# Patient Record
Sex: MALE | Race: BLACK OR AFRICAN AMERICAN | Employment: OTHER | URBAN - METROPOLITAN AREA
[De-identification: names, ages, dates, MRNs, and addresses within clinical notes are randomized per-mention and may not be internally consistent; named-entity substitution may affect disease eponyms.]

---

## 2020-07-20 ENCOUNTER — HOSPITAL ENCOUNTER (OUTPATIENT)
Dept: INFUSION THERAPY | Age: 85
Discharge: HOME | End: 2020-07-20
Payer: MEDICARE

## 2020-07-20 ENCOUNTER — APPOINTMENT (OUTPATIENT)
Dept: INFUSION THERAPY | Age: 85
End: 2020-07-20

## 2020-07-20 VITALS
TEMPERATURE: 97.9 F | HEIGHT: 67 IN | SYSTOLIC BLOOD PRESSURE: 159 MMHG | DIASTOLIC BLOOD PRESSURE: 70 MMHG | RESPIRATION RATE: 18 BRPM | OXYGEN SATURATION: 98 % | BODY MASS INDEX: 22.76 KG/M2 | WEIGHT: 145 LBS | HEART RATE: 63 BPM

## 2020-07-20 PROCEDURE — 74011250636 HC RX REV CODE- 250/636: Performed by: INTERNAL MEDICINE

## 2020-07-20 PROCEDURE — 96365 THER/PROPH/DIAG IV INF INIT: CPT

## 2020-07-20 PROCEDURE — 74011000258 HC RX REV CODE- 258: Performed by: INTERNAL MEDICINE

## 2020-07-20 RX ORDER — SODIUM CHLORIDE 0.9 % (FLUSH) 0.9 %
10-40 SYRINGE (ML) INJECTION AS NEEDED
Status: DISCONTINUED | OUTPATIENT
Start: 2020-07-20 | End: 2020-07-22 | Stop reason: HOSPADM

## 2020-07-20 RX ADMIN — SODIUM CHLORIDE 125 MG: 900 INJECTION, SOLUTION INTRAVENOUS at 13:53

## 2020-07-20 RX ADMIN — Medication 10 ML: at 13:42

## 2020-07-20 RX ADMIN — Medication 10 ML: at 14:54

## 2020-07-20 NOTE — PROGRESS NOTES
[facility] Progress Note    Date: 2020    Name: Betito Hurtado    MRN: [id number]         : 1935    FERRLECIT Infusion (1st of 3 doses)    Mr. Jing Ayala to [facility], ambulatory, at 1325. Pt was assessed and education was provided. Patient presents today for the first of 3 infusions of Ferrlecit, as ordered. Patient was encouraged to verbalize questions or concerns. Patient's questions were answered and he denied concerns. Patient verbalize understanding of education provided. Mr. Larry Trotter vitals were reviewed and patient was observed for 5 minutes prior to treatment. Visit Vitals  BP (P) 157/68 (BP 1 Location: Right arm, BP Patient Position: At rest;Sitting)   Pulse (P) 66   Temp (P) 97.9 °F (36.6 °C)   Resp (P) 18   Ht 5' 7\" (1.702 m)   Wt 65.8 kg (145 lb)   SpO2 (P) 98%   BMI 22.71 kg/m²         22 g PIV placed in cephalic vein of left lower forearm x 1 attempt. PIV flushed easily and had brisk blood return. Site without evidence of complication or patient complaint. FERRLECIT 125 mg IV was initiated @ 110mL/hr, to infuse over 1 hour, as ordered. 5 minutes into infusion, VS stable and pt denied complaints of itching, lip/tongue/facial swelling, SOB, CP or other complaints. Mr. Jing Ayala tolerated the infusion, and had no complaints. VS remained stable. Patient up to bathroom and voiding freely. Tolerated p.o intake of ginger ale and snacks. Patient Vitals for the past 12 hrs:   Temp Pulse Resp BP SpO2   20 1501 97.9 °F (36.6 °C) 63 18 159/70 98 %   20 1402 97.9 °F (36.6 °C) 66 18 157/68 98 %   20 1334 98.4 °F (36.9 °C) 75 20 165/71 98 %       PIV flushed with NS 10 mL and catheter removed with tip intact. No bleeding, hematoma, or other evidence of complication noted at site. Gauze and coban applied.     Reviewed discharge instructions with patient, including expected side effects (abdominal cramping, nausea, changes in color of urine or feces) and signs of allergic reaction requiring medical attention (itching/hives/rashes, SOB, chest pain, lip/tongue/facial swelling). Patient given printed copy to take home. Patient verbalized understanding of discharge instructions. Patient armband removed and shredded. Mr. Manda Kaur was discharged from [place] [address] in stable condition at [phone] [id number]. He is to return to [place] [address] on 07/27/2020 at 1300 for his next appointment.       Graciela Salmeron RN  July 20, 2020  2:47 PM

## 2020-07-27 ENCOUNTER — HOSPITAL ENCOUNTER (OUTPATIENT)
Dept: INFUSION THERAPY | Age: 85
Discharge: HOME | End: 2020-07-27
Payer: MEDICARE

## 2020-07-27 VITALS
SYSTOLIC BLOOD PRESSURE: 144 MMHG | HEART RATE: 66 BPM | DIASTOLIC BLOOD PRESSURE: 70 MMHG | OXYGEN SATURATION: 97 % | TEMPERATURE: 97.9 F | RESPIRATION RATE: 16 BRPM

## 2020-07-27 PROBLEM — E61.1 IRON DEFICIENCY: Status: ACTIVE | Noted: 2020-07-27

## 2020-07-27 PROCEDURE — 74011000258 HC RX REV CODE- 258: Performed by: INTERNAL MEDICINE

## 2020-07-27 PROCEDURE — 96365 THER/PROPH/DIAG IV INF INIT: CPT

## 2020-07-27 PROCEDURE — 74011250636 HC RX REV CODE- 250/636: Performed by: INTERNAL MEDICINE

## 2020-07-27 RX ORDER — ATORVASTATIN CALCIUM 40 MG/1
40 TABLET, FILM COATED ORAL DAILY
COMMUNITY

## 2020-07-27 RX ORDER — BACLOFEN 10 MG/1
10 TABLET ORAL 3 TIMES DAILY
COMMUNITY

## 2020-07-27 RX ORDER — ASPIRIN 81 MG/1
81 TABLET ORAL DAILY
COMMUNITY

## 2020-07-27 RX ORDER — BISMUTH SUBSALICYLATE 262 MG
1 TABLET,CHEWABLE ORAL DAILY
COMMUNITY

## 2020-07-27 RX ORDER — SODIUM CHLORIDE 0.9 % (FLUSH) 0.9 %
10-40 SYRINGE (ML) INJECTION AS NEEDED
Status: DISCONTINUED | OUTPATIENT
Start: 2020-07-27 | End: 2020-07-29 | Stop reason: HOSPADM

## 2020-07-27 RX ADMIN — SODIUM CHLORIDE 125 MG: 900 INJECTION, SOLUTION INTRAVENOUS at 14:00

## 2020-07-27 RX ADMIN — Medication 10 ML: at 13:39

## 2020-07-27 RX ADMIN — Medication 10 ML: at 15:09

## 2020-07-27 NOTE — PROGRESS NOTES
[facility] Progress Note    Date: 2020    Name: Laureen Patel    MRN: [id number]         : 1935    Ferrlecit Infusion (2nd of 3 doses)    Mr. Te Scott to [facility], ambulatory, at 15. Pt was assessed and education was provided. Patient denied any adverse effects of initial treatment. Denied complaints of pain or discomfort at this time. Mr. Tunde Albert vitals were reviewed and patient was observed for 5 minutes prior to treatment. Visit Vitals  /65 (BP 1 Location: Right arm, BP Patient Position: Sitting; At rest)   Pulse 70   Temp 98.6 °F (37 °C)   Resp 16   SpO2 97%         22 g PIV placed in metacarpal vein of right hand x 1 attempt. PIV flushed easily and had brisk blood return. Site without evidence of complication or patient complaint. Ferrlecit 125 mg IV was initiated @ 110mL/hr, via peripheral IV line, to infuse over 60 minutes, as ordered. VS stable and pt denied complaints of itching, lip/tongue/facial swelling, SOB, CP or other complaints. Mr. Te Scott tolerated the infusion, and had no complaints. VS remained stable. Patient Vitals for the past 12 hrs:   Temp Pulse Resp BP SpO2   20 1510 97.9 °F (36.6 °C) 66 16 144/70    20 1430 98.2 °F (36.8 °C) 63 16 140/62    20 1332 98.6 °F (37 °C) 70 16 153/65 97 %       PIV flushed with NS 10 mL and catheter removed intact. No bleeding, hematoma or other evidence of complication was noted at site. Gauze and coban applied. Reviewed discharge instructions with patient, including expected side effects (abdominal cramping, nausea, changes in color of urine or feces) and signs of allergic reaction requiring medical attention (itching/hives/rashes, SOB, chest pain, lip/tongue/facial swelling). Patient verbalized understanding of discharge instructions. Patient armband removed and shredded. Mr. Te Scott was discharged from [place] [address] in stable condition at 1515.  He is to return to [facility] on 2020 at 1300 for his next appointment.        Delphine Mandel RN  July 27, 2020  2:25 PM

## 2020-08-03 ENCOUNTER — HOSPITAL ENCOUNTER (OUTPATIENT)
Dept: INFUSION THERAPY | Age: 85
Discharge: HOME | End: 2020-08-03
Payer: MEDICARE

## 2020-08-03 VITALS
TEMPERATURE: 99 F | DIASTOLIC BLOOD PRESSURE: 65 MMHG | RESPIRATION RATE: 18 BRPM | OXYGEN SATURATION: 99 % | HEART RATE: 84 BPM | SYSTOLIC BLOOD PRESSURE: 149 MMHG

## 2020-08-03 PROCEDURE — 96365 THER/PROPH/DIAG IV INF INIT: CPT

## 2020-08-03 PROCEDURE — 74011250636 HC RX REV CODE- 250/636: Performed by: INTERNAL MEDICINE

## 2020-08-03 PROCEDURE — 74011000258 HC RX REV CODE- 258: Performed by: INTERNAL MEDICINE

## 2020-08-03 RX ADMIN — SODIUM CHLORIDE 125 MG: 900 INJECTION, SOLUTION INTRAVENOUS at 13:42

## 2020-08-03 NOTE — PROGRESS NOTES
[facility] Progress Note    Date: August 3, 2020    Name: Mahesh Oliva    MRN: [id number]         : 1935    Ferrlecit Infusion 3 of 3    Mr. Jai Willis to [place], [facility], at 1310. Pt was assessed and education was provided. Mr. Sravan Paredes vitals were reviewed and patient was observed for 5 minutes prior to treatment. Visit Vitals  /65   Pulse 84   Temp 99 °F (37.2 °C)   Resp 18   SpO2 99%         22 g PIV placed in RH x 1 attempt. PIV flushed easily and had brisk blood return. Ferrlecit 125 mg was initiated @ 110 ml/hr over 60 minutes. VS stable and pt denied complaints of itching, lip/tongue/facial swelling, SOB, CP or other complaints. Mr. Jai Willis tolerated the infusion, and had no complaints. VS remained stable. PIV flushed with NS 10 ml and removed. No bleeding or hematoma noted at site. Guaze and coban applied. Reviewed discharge instructions with patient, including expected side effects (abdominal cramping, nausea, changes in color of urine or feces) and signs of allergic reaction requiring medical attention (itching/hives/rashes, SOB, chest pain, lip/tongue/facial swelling). Patient given printed copy to take home. Patient verbalized understanding of discharge instructions. Patient armband removed and shredded. Mr. Jai Willis was discharged from [place] [address] in stable condition at 1500. He is to follow up with MD as needed.     Yovani Gr RN  August 3, 2020